# Patient Record
Sex: MALE | Race: WHITE | NOT HISPANIC OR LATINO | URBAN - METROPOLITAN AREA
[De-identification: names, ages, dates, MRNs, and addresses within clinical notes are randomized per-mention and may not be internally consistent; named-entity substitution may affect disease eponyms.]

---

## 2020-07-14 ENCOUNTER — OFFICE VISIT (OUTPATIENT)
Dept: URBAN - METROPOLITAN AREA CLINIC 113 | Facility: CLINIC | Age: 70
End: 2020-07-14

## 2020-07-25 ENCOUNTER — TELEPHONE ENCOUNTER (OUTPATIENT)
Dept: URBAN - METROPOLITAN AREA CLINIC 13 | Facility: CLINIC | Age: 70
End: 2020-07-25

## 2020-07-25 RX ORDER — DOXYCYCLINE HYCLATE 50 MG/1
TAKE CAPSULE  PRN CAPSULE ORAL
Refills: 0 | OUTPATIENT
End: 2017-09-06

## 2020-07-25 RX ORDER — DEXTROSE 4 G
TAKE 1 TABLET DAILY TABLET,CHEWABLE ORAL
Refills: 0 | OUTPATIENT
End: 2019-03-27

## 2020-07-25 RX ORDER — DIPHENHYDRAMINE HYDROCHLORIDE 25 MG/1
TAKE 1 TABLET TWICE DAILY 2.5 MG TABLET, FILM COATED ORAL
Refills: 0 | OUTPATIENT
End: 2017-09-06

## 2020-07-25 RX ORDER — OMEG3/DHA/EPA/FISH OIL/L.CASEI 120-400MG
TAKE 1 CAPSULE DAILY CAPSULE ORAL
Refills: 0 | OUTPATIENT
End: 2017-09-06

## 2020-07-25 RX ORDER — METHYLCELLULOSE 500 MG/1
TAKE 2 TABLET DAILY TABLET ORAL
Refills: 0 | OUTPATIENT
End: 2017-09-06

## 2020-07-25 RX ORDER — CLOBETASOL PROPIONATE 0.5 MG/G
APPLY INCH  PRN CREAM TOPICAL
Refills: 0 | OUTPATIENT
End: 2017-09-06

## 2020-07-25 RX ORDER — POLYETHYLENE GLYCOL 3350 AND ELECTROLYTES WITH LEMON FLAVOR 236; 22.74; 6.74; 5.86; 2.97 G/4L; G/4L; G/4L; G/4L; G/4L
TAKE AS DIRECTED POWDER, FOR SOLUTION ORAL
Qty: 1 | Refills: 0 | OUTPATIENT
Start: 2017-08-18 | End: 2017-09-06

## 2020-07-25 RX ORDER — CHOLESTYRAMINE 4 G/9G
TAKE PACKET DAILY 1-2 PACKETS POWDER, FOR SUSPENSION ORAL
Refills: 0 | OUTPATIENT
End: 2017-09-06

## 2020-07-26 ENCOUNTER — TELEPHONE ENCOUNTER (OUTPATIENT)
Dept: URBAN - METROPOLITAN AREA CLINIC 13 | Facility: CLINIC | Age: 70
End: 2020-07-26

## 2020-07-26 RX ORDER — BENZONATATE 200 MG/1
CAPSULE ORAL
Qty: 30 | Refills: 0 | Status: ACTIVE | COMMUNITY
Start: 2019-02-27

## 2020-07-26 RX ORDER — BROMPHENIRAMINE MALEATE, PSEUDOEPHEDRINE HYDROCHLORIDE, 2; 30; 10 MG/5ML; MG/5ML; MG/5ML
SYRUP ORAL
Qty: 180 | Refills: 0 | Status: ACTIVE | COMMUNITY
Start: 2019-01-07

## 2020-07-26 RX ORDER — METHYLPREDNISOLONE 4 MG/1
TABLET ORAL
Qty: 21 | Refills: 0 | Status: ACTIVE | COMMUNITY
Start: 2019-02-27

## 2020-07-26 RX ORDER — MELOXICAM 15 MG/1
TABLET ORAL
Qty: 30 | Refills: 0 | Status: ACTIVE | COMMUNITY
Start: 2018-04-02

## 2020-07-26 RX ORDER — INGENOL MEBUTATE 150 UG/G
GEL TOPICAL
Qty: 3 | Refills: 0 | Status: ACTIVE | COMMUNITY
Start: 2020-01-06

## 2020-07-26 RX ORDER — OSELTAMIVIR PHOSPHATE 75 MG/1
CAPSULE ORAL
Qty: 10 | Refills: 0 | Status: ACTIVE | COMMUNITY
Start: 2019-02-20

## 2020-07-26 RX ORDER — AMOXICILLIN AND CLAVULANATE POTASSIUM 875; 125 MG/1; MG/1
TABLET, FILM COATED ORAL
Qty: 20 | Refills: 0 | Status: ACTIVE | COMMUNITY
Start: 2020-04-03

## 2020-07-26 RX ORDER — AZITHROMYCIN DIHYDRATE 250 MG/1
TABLET, FILM COATED ORAL
Qty: 6 | Refills: 0 | Status: ACTIVE | COMMUNITY
Start: 2020-03-30

## 2020-07-26 RX ORDER — OMEPRAZOLE 20 MG/1
TAKE 1 TABLET DAILY TABLET, DELAYED RELEASE ORAL
Refills: 0 | Status: ACTIVE | COMMUNITY

## 2020-07-26 RX ORDER — ATORVASTATIN CALCIUM 40 MG/1
TAKE 1 TABLET  FIVE TIMES WEEKLY TABLET, FILM COATED ORAL
Refills: 0 | Status: ACTIVE | COMMUNITY

## 2020-07-26 RX ORDER — TOBRAMYCIN AND DEXAMETHASONE 3; 1 MG/ML; MG/ML
SUSPENSION/ DROPS OPHTHALMIC
Qty: 5 | Refills: 0 | Status: ACTIVE | COMMUNITY
Start: 2019-01-10

## 2020-07-26 RX ORDER — CODEINE PHOSPHATE AND GUAIFENESIN 10; 100 MG/5ML; MG/5ML
SOLUTION ORAL
Qty: 120 | Refills: 0 | Status: ACTIVE | COMMUNITY
Start: 2018-09-24

## 2020-07-26 RX ORDER — FLUTICASONE PROPIONATE 50 UG/1
INSTILL 2 SQUIRT DAILY SPRAY, METERED NASAL
Refills: 0 | Status: ACTIVE | COMMUNITY

## 2020-07-26 RX ORDER — MONTELUKAST SODIUM 10 MG/1
TABLET, FILM COATED ORAL
Qty: 30 | Refills: 0 | Status: ACTIVE | COMMUNITY
Start: 2018-04-02

## 2020-07-26 RX ORDER — GUAIFENESIN AND CODEINE PHOSPHATE 10; 100 MG/5ML; MG/5ML
LIQUID ORAL
Qty: 50 | Refills: 0 | Status: ACTIVE | COMMUNITY
Start: 2020-04-03

## 2020-07-26 RX ORDER — AZITHROMYCIN DIHYDRATE 250 MG/1
TABLET, FILM COATED ORAL
Qty: 6 | Refills: 0 | Status: ACTIVE | COMMUNITY
Start: 2019-02-27

## 2020-07-26 RX ORDER — AMOXICILLIN AND CLAVULANATE POTASSIUM 875; 125 MG/1; MG/1
TABLET, FILM COATED ORAL
Qty: 14 | Refills: 0 | Status: ACTIVE | COMMUNITY
Start: 2018-09-24

## 2020-07-26 RX ORDER — PREDNISONE 20 MG/1
TABLET ORAL
Qty: 10 | Refills: 0 | Status: ACTIVE | COMMUNITY
Start: 2019-01-22

## 2020-07-26 RX ORDER — HYDROCHLOROTHIAZIDE 50 MG/1
TABLET ORAL
Qty: 30 | Refills: 0 | Status: ACTIVE | COMMUNITY
Start: 2018-07-24

## 2020-07-26 RX ORDER — HYDROCHLOROTHIAZIDE 50 MG/1
TAKE 1 TABLET DAILY TABLET ORAL
Refills: 0 | Status: ACTIVE | COMMUNITY

## 2020-07-26 RX ORDER — ALBUTEROL SULFATE 90 UG/1
AEROSOL, METERED RESPIRATORY (INHALATION)
Qty: 8 | Refills: 0 | Status: ACTIVE | COMMUNITY
Start: 2020-04-01

## 2020-07-26 RX ORDER — ALBUTEROL SULFATE 90 UG/1
AEROSOL, METERED RESPIRATORY (INHALATION)
Qty: 85 | Refills: 0 | Status: ACTIVE | COMMUNITY
Start: 2019-02-27

## 2020-07-26 RX ORDER — CLOBETASOL PROPIONATE 0.5 MG/ML
SOLUTION TOPICAL
Qty: 50 | Refills: 0 | Status: ACTIVE | COMMUNITY
Start: 2018-02-26

## 2020-07-26 RX ORDER — VALSARTAN 160 MG/1
TABLET ORAL
Qty: 30 | Refills: 0 | Status: ACTIVE | COMMUNITY
Start: 2020-04-01

## 2021-03-02 ENCOUNTER — ERX REFILL RESPONSE (OUTPATIENT)
Dept: URBAN - METROPOLITAN AREA CLINIC 113 | Facility: CLINIC | Age: 71
End: 2021-03-02

## 2021-03-02 RX ORDER — PANCRELIPASE 24000; 76000; 120000 [USP'U]/1; [USP'U]/1; [USP'U]/1
TAKE 2 CAPSULES BY MOUTH THREE TIMES DAILY BEFORE MEALS CAPSULE, DELAYED RELEASE PELLETS ORAL
Qty: 300 | Refills: 1

## 2021-03-15 ENCOUNTER — TELEPHONE ENCOUNTER (OUTPATIENT)
Dept: URBAN - METROPOLITAN AREA CLINIC 113 | Facility: CLINIC | Age: 71
End: 2021-03-15

## 2021-03-23 ENCOUNTER — OFFICE VISIT (OUTPATIENT)
Dept: URBAN - METROPOLITAN AREA CLINIC 113 | Facility: CLINIC | Age: 71
End: 2021-03-23

## 2021-03-26 ENCOUNTER — WEB ENCOUNTER (OUTPATIENT)
Dept: URBAN - METROPOLITAN AREA CLINIC 113 | Facility: CLINIC | Age: 71
End: 2021-03-26

## 2021-03-26 ENCOUNTER — OFFICE VISIT (OUTPATIENT)
Dept: URBAN - METROPOLITAN AREA CLINIC 113 | Facility: CLINIC | Age: 71
End: 2021-03-26
Payer: MEDICARE

## 2021-03-26 VITALS
RESPIRATION RATE: 20 BRPM | HEART RATE: 88 BPM | DIASTOLIC BLOOD PRESSURE: 57 MMHG | WEIGHT: 198 LBS | SYSTOLIC BLOOD PRESSURE: 89 MMHG | TEMPERATURE: 98.2 F | HEIGHT: 71 IN | BODY MASS INDEX: 27.72 KG/M2

## 2021-03-26 DIAGNOSIS — K86.81 EXOCRINE PANCREATIC INSUFFICIENCY: ICD-10-CM

## 2021-03-26 DIAGNOSIS — Z86.010 HISTORY OF COLON POLYPS: ICD-10-CM

## 2021-03-26 PROCEDURE — 99212 OFFICE O/P EST SF 10 MIN: CPT | Performed by: INTERNAL MEDICINE

## 2021-03-26 RX ORDER — BROMPHENIRAMINE MALEATE, PSEUDOEPHEDRINE HYDROCHLORIDE, 2; 30; 10 MG/5ML; MG/5ML; MG/5ML
SYRUP ORAL
Qty: 180 | Refills: 0 | Status: DISCONTINUED | COMMUNITY
Start: 2019-01-07

## 2021-03-26 RX ORDER — METHYLPREDNISOLONE 4 MG/1
TABLET ORAL
Qty: 21 | Refills: 0 | Status: DISCONTINUED | COMMUNITY
Start: 2019-02-27

## 2021-03-26 RX ORDER — BENZONATATE 200 MG/1
CAPSULE ORAL
Qty: 30 | Refills: 0 | Status: DISCONTINUED | COMMUNITY
Start: 2019-02-27

## 2021-03-26 RX ORDER — HYDROCHLOROTHIAZIDE 50 MG/1
TAKE 1 TABLET DAILY TABLET ORAL
Refills: 0 | Status: DISCONTINUED | COMMUNITY

## 2021-03-26 RX ORDER — OMEPRAZOLE 20 MG/1
TAKE 1 TABLET DAILY TABLET, DELAYED RELEASE ORAL
Refills: 0 | Status: DISCONTINUED | COMMUNITY

## 2021-03-26 RX ORDER — PANCRELIPASE 24000; 76000; 120000 [USP'U]/1; [USP'U]/1; [USP'U]/1
TAKE 2 CAPSULES BY MOUTH THREE TIMES DAILY BEFORE MEALS CAPSULE, DELAYED RELEASE PELLETS ORAL
Qty: 300 | Refills: 1 | Status: ACTIVE | COMMUNITY

## 2021-03-26 RX ORDER — VALSARTAN 160 MG/1
1 TABLET TABLET ORAL ONCE A DAY
Refills: 0 | Status: ACTIVE | COMMUNITY
Start: 2020-04-01

## 2021-03-26 RX ORDER — MONTELUKAST SODIUM 10 MG/1
TABLET, FILM COATED ORAL
Qty: 30 | Refills: 0 | Status: DISCONTINUED | COMMUNITY
Start: 2018-04-02

## 2021-03-26 RX ORDER — AZITHROMYCIN DIHYDRATE 250 MG/1
TABLET, FILM COATED ORAL
Qty: 6 | Refills: 0 | Status: DISCONTINUED | COMMUNITY
Start: 2019-02-27

## 2021-03-26 RX ORDER — ATORVASTATIN CALCIUM 40 MG/1
TAKE 1 TABLET  FIVE TIMES WEEKLY TABLET, FILM COATED ORAL
Refills: 0 | Status: ACTIVE | COMMUNITY

## 2021-03-26 RX ORDER — FLUTICASONE PROPIONATE 50 UG/1
INSTILL 2 SQUIRT DAILY SPRAY, METERED NASAL
Refills: 0 | Status: ACTIVE | COMMUNITY

## 2021-03-26 RX ORDER — INGENOL MEBUTATE 150 UG/G
GEL TOPICAL
Qty: 3 | Refills: 0 | Status: DISCONTINUED | COMMUNITY
Start: 2020-01-06

## 2021-03-26 RX ORDER — CLOBETASOL PROPIONATE 0.5 MG/ML
SOLUTION TOPICAL
Qty: 50 | Refills: 0 | Status: ACTIVE | COMMUNITY
Start: 2018-02-26

## 2021-03-26 RX ORDER — AMOXICILLIN AND CLAVULANATE POTASSIUM 875; 125 MG/1; MG/1
TABLET, FILM COATED ORAL
Qty: 14 | Refills: 0 | Status: DISCONTINUED | COMMUNITY
Start: 2018-09-24

## 2021-03-26 RX ORDER — TOBRAMYCIN AND DEXAMETHASONE 3; 1 MG/ML; MG/ML
SUSPENSION/ DROPS OPHTHALMIC
Qty: 5 | Refills: 0 | Status: DISCONTINUED | COMMUNITY
Start: 2019-01-10

## 2021-03-26 RX ORDER — CODEINE PHOSPHATE AND GUAIFENESIN 10; 100 MG/5ML; MG/5ML
SOLUTION ORAL
Qty: 120 | Refills: 0 | Status: DISCONTINUED | COMMUNITY
Start: 2018-09-24

## 2021-03-26 RX ORDER — ALBUTEROL SULFATE 90 UG/1
AEROSOL, METERED RESPIRATORY (INHALATION)
Qty: 85 | Refills: 0 | Status: DISCONTINUED | COMMUNITY
Start: 2019-02-27

## 2021-03-26 RX ORDER — OSELTAMIVIR PHOSPHATE 75 MG/1
CAPSULE ORAL
Qty: 10 | Refills: 0 | Status: DISCONTINUED | COMMUNITY
Start: 2019-02-20

## 2021-03-26 RX ORDER — PREDNISONE 20 MG/1
TABLET ORAL
Qty: 10 | Refills: 0 | Status: DISCONTINUED | COMMUNITY
Start: 2019-01-22

## 2021-03-26 RX ORDER — GUAIFENESIN AND CODEINE PHOSPHATE 10; 100 MG/5ML; MG/5ML
LIQUID ORAL
Qty: 50 | Refills: 0 | Status: DISCONTINUED | COMMUNITY
Start: 2020-04-03

## 2021-03-26 RX ORDER — MELOXICAM 15 MG/1
TABLET ORAL
Qty: 30 | Refills: 0 | Status: DISCONTINUED | COMMUNITY
Start: 2018-04-02

## 2021-03-26 NOTE — EXAM-PHYSICAL EXAM
He is alert and oriented to person place and situation in no acute distress.  He has no scleral icterus.

## 2021-03-26 NOTE — HPI-TODAY'S VISIT:
The patient is a very pleasant 70-year-old gentleman who I last saw in March 2019 for diarrhea.  We have been following him for chronic diarrhea.  Pancreatic enzymes at that time had resolved his diarrhea.  CT scan of the pancreas was unremarkable.  Last colonoscopy was in January 2017 by Dr. Camargo.  This examination revealed diverticulosis and a diminutive polyp.  The polyp returned as a tubular adenoma so he would therefore be due for colonoscopy in August 2022.  CT scan in August 2017 revealed a large right kidney stone.  Diverticulosis and bilateral inguinal hernias.  Laboratory testing 1 year ago revealed a normal CMP. The patient states he is done very well since I last saw him.  His only new past medical history was recurring problems and nephrolithiasis.  He states pancreatic enzymes work very well to control diarrhea.  He does discuss with me concerns that he had that his urology practice fired him but he is not sure why.  He is very upset about this.

## 2021-07-23 ENCOUNTER — TELEPHONE ENCOUNTER (OUTPATIENT)
Dept: URBAN - METROPOLITAN AREA CLINIC 113 | Facility: CLINIC | Age: 71
End: 2021-07-23

## 2021-07-23 RX ORDER — PANCRELIPASE 24000; 76000; 120000 [USP'U]/1; [USP'U]/1; [USP'U]/1
TAKE 2 CAPSULES BY MOUTH THREE TIMES DAILY BEFORE MEALS CAPSULE, DELAYED RELEASE PELLETS ORAL
Qty: 540 | Refills: 1

## 2022-05-17 ENCOUNTER — ERX REFILL RESPONSE (OUTPATIENT)
Dept: URBAN - METROPOLITAN AREA CLINIC 113 | Facility: CLINIC | Age: 72
End: 2022-05-17

## 2022-05-17 RX ORDER — PANCRELIPASE 24000; 76000; 120000 [USP'U]/1; [USP'U]/1; [USP'U]/1
TAKE 2 CAPSULES BY MOUTH THREE TIMES DAILY BEFORE MEALS CAPSULE, DELAYED RELEASE PELLETS ORAL
Qty: 540 | Refills: 1 | OUTPATIENT

## 2022-06-20 ENCOUNTER — OFFICE VISIT (OUTPATIENT)
Dept: URBAN - METROPOLITAN AREA CLINIC 113 | Facility: CLINIC | Age: 72
End: 2022-06-20
Payer: MEDICARE

## 2022-06-20 ENCOUNTER — LAB OUTSIDE AN ENCOUNTER (OUTPATIENT)
Dept: URBAN - METROPOLITAN AREA CLINIC 113 | Facility: CLINIC | Age: 72
End: 2022-06-20

## 2022-06-20 ENCOUNTER — TELEPHONE ENCOUNTER (OUTPATIENT)
Dept: URBAN - METROPOLITAN AREA CLINIC 113 | Facility: CLINIC | Age: 72
End: 2022-06-20

## 2022-06-20 VITALS
WEIGHT: 199 LBS | TEMPERATURE: 98 F | BODY MASS INDEX: 27.86 KG/M2 | DIASTOLIC BLOOD PRESSURE: 72 MMHG | SYSTOLIC BLOOD PRESSURE: 117 MMHG | HEIGHT: 71 IN | HEART RATE: 68 BPM

## 2022-06-20 DIAGNOSIS — K52.9 CHRONIC DIARRHEA: ICD-10-CM

## 2022-06-20 DIAGNOSIS — K86.81 EXOCRINE PANCREATIC INSUFFICIENCY: ICD-10-CM

## 2022-06-20 DIAGNOSIS — Z86.010 HISTORY OF COLON POLYPS: ICD-10-CM

## 2022-06-20 PROCEDURE — 99214 OFFICE O/P EST MOD 30 MIN: CPT | Performed by: INTERNAL MEDICINE

## 2022-06-20 RX ORDER — CLOBETASOL PROPIONATE 0.5 MG/ML
SOLUTION TOPICAL
Qty: 50 | Refills: 0 | Status: ACTIVE | COMMUNITY
Start: 2018-02-26

## 2022-06-20 RX ORDER — VALSARTAN 160 MG/1
1 TABLET TABLET ORAL ONCE A DAY
Refills: 0 | Status: ACTIVE | COMMUNITY
Start: 2020-04-01

## 2022-06-20 RX ORDER — ATORVASTATIN CALCIUM 40 MG/1
TAKE 1 TABLET  FIVE TIMES WEEKLY TABLET, FILM COATED ORAL
Refills: 0 | Status: ACTIVE | COMMUNITY

## 2022-06-20 RX ORDER — PANCRELIPASE 24000; 76000; 120000 [USP'U]/1; [USP'U]/1; [USP'U]/1
TAKE 2 CAPSULES BY MOUTH THREE TIMES DAILY BEFORE MEALS CAPSULE, DELAYED RELEASE PELLETS ORAL
Qty: 540 | Refills: 1 | Status: ACTIVE | COMMUNITY

## 2022-06-20 RX ORDER — FLUTICASONE PROPIONATE 50 UG/1
INSTILL 2 SQUIRT DAILY SPRAY, METERED NASAL
Refills: 0 | Status: ACTIVE | COMMUNITY

## 2022-06-20 RX ORDER — POLYETHYLENE GLYCOL 3350, SODIUM CHLORIDE, SODIUM BICARBONATE, POTASSIUM CHLORIDE 420; 11.2; 5.72; 1.48 G/4L; G/4L; G/4L; G/4L
420 GM POWDER, FOR SOLUTION ORAL ONCE
Qty: 420 GM | Refills: 0 | OUTPATIENT
Start: 2022-06-20 | End: 2022-06-21

## 2022-06-20 NOTE — HPI-TODAY'S VISIT:
The patient is a very pleasant 70-year-old gentleman who I last saw in March 2019 for diarrhea.  We have been following him for chronic diarrhea.  Pancreatic enzymes at that time had resolved his diarrhea.  CT scan of the pancreas was unremarkable.  Last colonoscopy was in January 2017 by Dr. Camargo.  This examination revealed diverticulosis and a diminutive polyp.  The polyp returned as a tubular adenoma so he would therefore be due for colonoscopy in August 2022.  CT scan in August 2017 revealed a large right kidney stone.  Diverticulosis and bilateral inguinal hernias.  Laboratory testing 1 year ago revealed a normal CMP. The patient states he is done very well since I last saw him.  His only new past medical history was recurring problems and nephrolithiasis.  He states pancreatic enzymes work very well to control diarrhea.  He does discuss with me concerns that he had that his urology practice fired him but he is not sure why.  He is very upset about this. Interval history.  The patient states that his diarrhea was doing quite well till about 6 months ago and has had to steadily increase his pancreatic enzymes where he now takes 2 tablets 3 times a day to have control.  He has not noted any change in diet or medications other than the increase in his antihypertensives.  No dietary changes.

## 2022-06-21 ENCOUNTER — TELEPHONE ENCOUNTER (OUTPATIENT)
Dept: URBAN - METROPOLITAN AREA CLINIC 113 | Facility: CLINIC | Age: 72
End: 2022-06-21

## 2022-06-21 RX ORDER — CHOLESTYRAMINE 4 G/9G
1 SCOOP POWDER, FOR SUSPENSION ORAL TWICE A DAY
Qty: 180 | Refills: 2 | OUTPATIENT
Start: 2022-06-21

## 2022-08-09 ENCOUNTER — OFFICE VISIT (OUTPATIENT)
Dept: URBAN - METROPOLITAN AREA SURGERY CENTER 25 | Facility: SURGERY CENTER | Age: 72
End: 2022-08-09
Payer: MEDICARE

## 2022-08-09 ENCOUNTER — CLAIMS CREATED FROM THE CLAIM WINDOW (OUTPATIENT)
Dept: URBAN - METROPOLITAN AREA CLINIC 4 | Facility: CLINIC | Age: 72
End: 2022-08-09
Payer: MEDICARE

## 2022-08-09 DIAGNOSIS — K63.89 OTHER SPECIFIED DISEASES OF INTESTINE: ICD-10-CM

## 2022-08-09 DIAGNOSIS — K52.89 OTHER SPECIFIED NONINFECTIVE GASTROENTERITIS AND COLITIS: ICD-10-CM

## 2022-08-09 DIAGNOSIS — K52.89 OTHER AND UNSPECIFIED NONINFECTIOUS GASTROENTERITIS AND COLITIS: ICD-10-CM

## 2022-08-09 DIAGNOSIS — Z86.010 HISTORY OF COLON POLYPS: ICD-10-CM

## 2022-08-09 PROCEDURE — G8907 PT DOC NO EVENTS ON DISCHARG: HCPCS | Performed by: INTERNAL MEDICINE

## 2022-08-09 PROCEDURE — 45380 COLONOSCOPY AND BIOPSY: CPT | Performed by: INTERNAL MEDICINE

## 2022-08-09 PROCEDURE — 88305 TISSUE EXAM BY PATHOLOGIST: CPT | Performed by: PATHOLOGY

## 2022-08-09 RX ORDER — CHOLESTYRAMINE 4 G/9G
1 SCOOP POWDER, FOR SUSPENSION ORAL TWICE A DAY
Qty: 180 | Refills: 2 | Status: ACTIVE | COMMUNITY
Start: 2022-06-21

## 2022-08-09 RX ORDER — FLUTICASONE PROPIONATE 50 UG/1
INSTILL 2 SQUIRT DAILY SPRAY, METERED NASAL
Refills: 0 | Status: ACTIVE | COMMUNITY

## 2022-08-09 RX ORDER — ATORVASTATIN CALCIUM 40 MG/1
TAKE 1 TABLET  FIVE TIMES WEEKLY TABLET, FILM COATED ORAL
Refills: 0 | Status: ACTIVE | COMMUNITY

## 2022-08-09 RX ORDER — VALSARTAN 160 MG/1
1 TABLET TABLET ORAL ONCE A DAY
Refills: 0 | Status: ACTIVE | COMMUNITY
Start: 2020-04-01

## 2022-08-09 RX ORDER — PANCRELIPASE 24000; 76000; 120000 [USP'U]/1; [USP'U]/1; [USP'U]/1
TAKE 2 CAPSULES BY MOUTH THREE TIMES DAILY BEFORE MEALS CAPSULE, DELAYED RELEASE PELLETS ORAL
Qty: 540 | Refills: 1 | Status: ACTIVE | COMMUNITY

## 2022-08-09 RX ORDER — CLOBETASOL PROPIONATE 0.5 MG/ML
SOLUTION TOPICAL
Qty: 50 | Refills: 0 | Status: ACTIVE | COMMUNITY
Start: 2018-02-26

## 2022-08-19 PROBLEM — 428283002: Status: ACTIVE | Noted: 2021-03-26

## 2022-08-31 ENCOUNTER — TELEPHONE ENCOUNTER (OUTPATIENT)
Dept: URBAN - METROPOLITAN AREA CLINIC 113 | Facility: CLINIC | Age: 72
End: 2022-08-31

## 2022-08-31 RX ORDER — PANCRELIPASE 24000; 76000; 120000 [USP'U]/1; [USP'U]/1; [USP'U]/1
AS DIRECTED CAPSULE, DELAYED RELEASE PELLETS ORAL
Qty: 540 | Refills: 3

## 2023-10-26 ENCOUNTER — ERX REFILL RESPONSE (OUTPATIENT)
Dept: URBAN - METROPOLITAN AREA CLINIC 113 | Facility: CLINIC | Age: 73
End: 2023-10-26

## 2023-10-26 RX ORDER — PANCRELIPASE 24000; 76000; 120000 [USP'U]/1; [USP'U]/1; [USP'U]/1
AS DIRECTED CAPSULE, DELAYED RELEASE PELLETS ORAL
Qty: 540 | Refills: 3 | OUTPATIENT

## 2023-10-31 ENCOUNTER — OFFICE VISIT (OUTPATIENT)
Dept: URBAN - METROPOLITAN AREA CLINIC 113 | Facility: CLINIC | Age: 73
End: 2023-10-31
Payer: MEDICARE

## 2023-10-31 ENCOUNTER — DASHBOARD ENCOUNTERS (OUTPATIENT)
Age: 73
End: 2023-10-31

## 2023-10-31 VITALS
SYSTOLIC BLOOD PRESSURE: 121 MMHG | BODY MASS INDEX: 26.52 KG/M2 | HEART RATE: 65 BPM | TEMPERATURE: 97.3 F | DIASTOLIC BLOOD PRESSURE: 65 MMHG | WEIGHT: 189.4 LBS | RESPIRATION RATE: 16 BRPM | HEIGHT: 71 IN

## 2023-10-31 DIAGNOSIS — K52.89 OTHER SPECIFIED NONINFECTIVE GASTROENTERITIS AND COLITIS: ICD-10-CM

## 2023-10-31 DIAGNOSIS — R19.7 CHRONIC DIARRHEA: ICD-10-CM

## 2023-10-31 DIAGNOSIS — K86.81 EXOCRINE PANCREATIC INSUFFICIENCY: ICD-10-CM

## 2023-10-31 DIAGNOSIS — Z86.010 HISTORY OF COLON POLYPS: ICD-10-CM

## 2023-10-31 PROCEDURE — 99213 OFFICE O/P EST LOW 20 MIN: CPT

## 2023-10-31 RX ORDER — ATORVASTATIN CALCIUM 40 MG/1
TAKE 1 TABLET  FIVE TIMES WEEKLY TABLET, FILM COATED ORAL
Refills: 0 | Status: ACTIVE | COMMUNITY

## 2023-10-31 RX ORDER — PANCRELIPASE 24000; 76000; 120000 [USP'U]/1; [USP'U]/1; [USP'U]/1
AS DIRECTED CAPSULE, DELAYED RELEASE PELLETS ORAL
Qty: 540 | Refills: 3 | Status: ACTIVE | COMMUNITY

## 2023-10-31 RX ORDER — CHOLESTYRAMINE 4 G/9G
1 SCOOP POWDER, FOR SUSPENSION ORAL TWICE A DAY
Qty: 180 | Refills: 2 | Status: ACTIVE | COMMUNITY
Start: 2022-06-21

## 2023-10-31 RX ORDER — FLUTICASONE PROPIONATE 50 UG/1
INSTILL 2 SQUIRT DAILY SPRAY, METERED NASAL
Refills: 0 | Status: ACTIVE | COMMUNITY

## 2023-10-31 RX ORDER — VALSARTAN 160 MG/1
1 TABLET TABLET ORAL ONCE A DAY
Refills: 0 | Status: ACTIVE | COMMUNITY
Start: 2020-04-01

## 2023-10-31 RX ORDER — CELECOXIB 200 MG/1
1 CAPSULE WITH FOOD CAPSULE ORAL
Status: ACTIVE | COMMUNITY

## 2023-10-31 RX ORDER — PANCRELIPASE 24000; 76000; 120000 [USP'U]/1; [USP'U]/1; [USP'U]/1
AS DIRECTED CAPSULE, DELAYED RELEASE PELLETS ORAL
Qty: 540 | Refills: 3
End: 2024-10-25

## 2023-10-31 RX ORDER — CLOBETASOL PROPIONATE 0.5 MG/ML
SOLUTION TOPICAL
Qty: 50 | Refills: 0 | Status: ACTIVE | COMMUNITY
Start: 2018-02-26

## 2023-10-31 NOTE — PHYSICAL EXAM PSYCH:
normal mood with appropriate affect , good eye contact , normal mood with appropriate affect , good eye contact

## 2023-10-31 NOTE — PHYSICAL EXAM EYES:
Sclerae : White without injection , Conjuntivae and eyelids appear normal , Sclerae : White without injection , Conjuntivae and eyelids appear normal

## 2023-10-31 NOTE — HPI-TODAY'S VISIT:
The patient is a very pleasant 70-year-old gentleman who I last saw in March 2019 for diarrhea.  We have been following him for chronic diarrhea.  Pancreatic enzymes at that time had resolved his diarrhea.  CT scan of the pancreas was unremarkable.  Last colonoscopy was in January 2017 by Dr. Camargo.  This examination revealed diverticulosis and a diminutive polyp.  The polyp returned as a tubular adenoma so he would therefore be due for colonoscopy in August 2022.  CT scan in August 2017 revealed a large right kidney stone.  Diverticulosis and bilateral inguinal hernias.  Laboratory testing 1 year ago revealed a normal CMP.  The patient states he is done very well since I last saw him.  His only new past medical history was recurring problems and nephrolithiasis.  He states pancreatic enzymes work very well to control diarrhea.  He does discuss with me concerns that he had that his urology practice fired him but he is not sure why.  He is very upset about this.  Interval history.  The patient states that his diarrhea was doing quite well till about 6 months ago and has had to steadily increase his pancreatic enzymes where he now takes 2 tablets 3 times a day to have control.  He has not noted any change in diet or medications other than the increase in his antihypertensives.  No dietary changes.  Interval history, 10/31/2023: 73-year-old male presents for long interval follow-up.  He was last seen on 6/20/2022.  His diarrhea responded well to pancreatic enzymes that he was provided Questran should his symptoms persist.  He was due for surveillance colonoscopy.  Colonoscopy 8/9/2022:Performed without difficulty.  BBPS score of 9.  Distal ileum contained a few scattered nonbleeding erosions, biopsy.  Pathology revealed focal acute ileitis with erosions but no features of chronicity.  This is most suggestive of ubiquitous use of NSAIDs or resolving infectious diarrheal illness though Crohn's disease and ischemic enteritis cannot be completely ruled out.  Diverticulosis in the sigmoid colon.  Repeat colonoscopy in 10 years for screening purposes.  He is presenting for medication refill.  Labs 2/21/2023:Hemoglobin 12.1, hematocrit 37.1. Labs 8/1/2023:Unremarkable BMP, normal ALT, unremarkable lipid panel.  He takes 2 capsules of Creon about twice per day. He will take the first dose with his breatkfast and the second dose at night without food. He takes Questran as needed. He will have some issues leaving the house in the morning due to diarrhea otherwise, once he is out, he has no issues. He denies blood per rectum or significant abdominal pain. Denies nausea or vomiting. Denies heartburn or dysohagia. He is taking Celebrex every other day for joint pains. He takes a daily Prilosec as well.

## 2023-10-31 NOTE — PHYSICAL EXAM CONSTITUTIONAL:
normal , pleasant, well nourished, in no acute distress , normal , pleasant, well nourished, in no acute distress

## 2024-11-16 ENCOUNTER — ERX REFILL RESPONSE (OUTPATIENT)
Dept: URBAN - METROPOLITAN AREA CLINIC 113 | Facility: CLINIC | Age: 74
End: 2024-11-16

## 2024-11-16 RX ORDER — PANCRELIPASE 36000; 180000; 114000 [USP'U]/1; [USP'U]/1; [USP'U]/1
2 TABLETS WITH MEALS, AND 1 TABLET WITH SNACKS CAPSULE, DELAYED RELEASE PELLETS ORAL
Qty: 300 | Refills: 0 | OUTPATIENT

## 2024-11-16 RX ORDER — PANCRELIPASE 24000; 76000; 120000 [USP'U]/1; [USP'U]/1; [USP'U]/1
TAKE TWO CAPSULES BY MOUTH THREE TIMES DAILY BEFORE MEALS CAPSULE, DELAYED RELEASE PELLETS ORAL
Qty: 540 CAPSULE | Refills: 4 | OUTPATIENT

## 2024-11-26 ENCOUNTER — TELEPHONE ENCOUNTER (OUTPATIENT)
Dept: URBAN - METROPOLITAN AREA CLINIC 107 | Facility: CLINIC | Age: 74
End: 2024-11-26

## 2024-12-03 ENCOUNTER — OFFICE VISIT (OUTPATIENT)
Dept: URBAN - METROPOLITAN AREA CLINIC 107 | Facility: CLINIC | Age: 74
End: 2024-12-03
Payer: COMMERCIAL

## 2024-12-03 VITALS
BODY MASS INDEX: 28 KG/M2 | SYSTOLIC BLOOD PRESSURE: 170 MMHG | OXYGEN SATURATION: 97 % | HEART RATE: 90 BPM | RESPIRATION RATE: 20 BRPM | DIASTOLIC BLOOD PRESSURE: 90 MMHG | WEIGHT: 200 LBS | TEMPERATURE: 97.7 F | HEIGHT: 71 IN

## 2024-12-03 DIAGNOSIS — K86.81 EXOCRINE PANCREATIC INSUFFICIENCY: ICD-10-CM

## 2024-12-03 DIAGNOSIS — Z86.0100 HISTORY OF COLON POLYPS: ICD-10-CM

## 2024-12-03 DIAGNOSIS — K21.9 GASTROESOPHAGEAL REFLUX DISEASE WITHOUT ESOPHAGITIS: ICD-10-CM

## 2024-12-03 DIAGNOSIS — K52.9 ILEITIS: ICD-10-CM

## 2024-12-03 PROBLEM — 266435005: Status: ACTIVE | Noted: 2024-12-03

## 2024-12-03 PROCEDURE — 99214 OFFICE O/P EST MOD 30 MIN: CPT | Performed by: NURSE PRACTITIONER

## 2024-12-03 RX ORDER — VALSARTAN 80 MG/1
1 TABLET TABLET, FILM COATED ORAL ONCE A DAY
Refills: 0 | Status: ACTIVE | COMMUNITY
Start: 2020-04-01

## 2024-12-03 RX ORDER — CLOBETASOL PROPIONATE 0.5 MG/ML
SOLUTION TOPICAL
Qty: 50 | Refills: 0 | Status: ACTIVE | COMMUNITY
Start: 2018-02-26

## 2024-12-03 RX ORDER — PANCRELIPASE 24000; 76000; 120000 [USP'U]/1; [USP'U]/1; [USP'U]/1
AS DIRECTED CAPSULE, DELAYED RELEASE PELLETS ORAL
Refills: 0 | Status: ACTIVE | COMMUNITY

## 2024-12-03 RX ORDER — CELECOXIB 200 MG/1
1 CAPSULE WITH FOOD CAPSULE ORAL
Status: DISCONTINUED | COMMUNITY

## 2024-12-03 RX ORDER — ATORVASTATIN CALCIUM 40 MG/1
TAKE 1 TABLET  FIVE TIMES WEEKLY TABLET, FILM COATED ORAL
Refills: 0 | Status: ACTIVE | COMMUNITY

## 2024-12-03 RX ORDER — CHOLESTYRAMINE 4 G/9G
1 SCOOP POWDER, FOR SUSPENSION ORAL TWICE A DAY
Qty: 180 | Refills: 2 | Status: DISCONTINUED | COMMUNITY
Start: 2022-06-21

## 2024-12-03 RX ORDER — FLUTICASONE PROPIONATE 50 UG/1
INSTILL 2 SQUIRT DAILY SPRAY, METERED NASAL
Refills: 0 | Status: ACTIVE | COMMUNITY

## 2024-12-03 RX ORDER — OMEPRAZOLE 20 MG/1
1 CAPSULE 1/2 TO 1 HOUR BEFORE MORNING MEAL CAPSULE, DELAYED RELEASE ORAL ONCE A DAY
Status: ACTIVE | COMMUNITY

## 2024-12-03 NOTE — HPI-TODAY'S VISIT:
The patient is a very pleasant 74-year-old gentleman here for annual visit. We have been following him for chronic diarrhea.  Pancreatic enzymes at that time had resolved his diarrhea.  CT scan of the pancreas was unremarkable.  Last colonoscopy was in January 2017 by Dr. Camargo.  This examination revealed diverticulosis and a diminutive polyp.  The polyp returned as a tubular adenoma, so he would therefore be due for colonoscopy in August 2022.  CT scan in August 2017 revealed a large right kidney stone.  Diverticulosis and bilateral inguinal hernias.  Laboratory testing 1 year ago revealed a normal CMP.  The patient states he is done very well since I last saw him.  His only new past medical history was recurring problems and nephrolithiasis.  He states pancreatic enzymes work very well to control diarrhea.  He does discuss with me concerns that he had that his urology practice fired him, but he is not sure why.  He is very upset about this.  Interval history.  The patient states that his diarrhea was doing quite well till about 6 months ago and has had to steadily increase his pancreatic enzymes where he now takes 2 tablets 3 times a day to have control.  He has not noted any change in diet or medications other than the increase in his antihypertensives.  No dietary changes.  Interval history, 10/31/2023: 73-year-old male presents for long interval follow-up.  He was last seen on 6/20/2022.  His diarrhea responded well to pancreatic enzymes that he was provided Questran should his symptoms persist.  He was due for surveillance colonoscopy.  Colonoscopy 8/9/2022:Performed without difficulty.  BBPS score of 9.  Distal ileum contained a few scattered nonbleeding erosions, biopsy.  Pathology revealed focal acute ileitis with erosions but no features of chronicity.  This is most suggestive of ubiquitous use of NSAIDs or resolving infectious diarrheal illness though Crohn's disease and ischemic enteritis cannot be completely ruled out.  Diverticulosis in the sigmoid colon.  Repeat colonoscopy in 10 years for screening purposes.  He is presenting for medication refill.  Labs 2/21/2023:Hemoglobin 12.1, hematocrit 37.1. Labs 8/1/2023:Unremarkable BMP, normal ALT, unremarkable lipid panel.  He takes 2 capsules of Creon about twice per day. He will take the first dose with his breakfast and the second dose at night without food. He takes Questran as needed. He will have some issues leaving the house in the morning due to diarrhea otherwise, once he is out, he has no issues. He denies blood per rectum or significant abdominal pain. Denies nausea or vomiting. Denies heartburn or dysphagia. He is taking Celebrex every other day for joint pains. He takes a daily Prilosec as well.  Last seen 10/31/2023 for EPI, chronic diarrhea, ileitis and history of colon polyps. Continue Creon currently on 24K units. Takes 2 capsules 3 times daily before meals. Consider increasing 36,000 units. Continue Questran for exacerbation. Acute ileitis was felt likely secondary to NSAID use. Due for surveillance colonoscopy 2032.  Interval history, 12/3/2024 CT abdomen and pelvis without contrast renal stone protocol 9/26/2024. Cyst in segment 8 of the liver measuring 10 mm. Left-sided nephrolithiasis with calculi in the distal left ureter near ureterovesical junction no left-sided obstructive uropathy. Sigmoid colonic diverticulosis without acute diverticulitis. Labs 7/9/2024. CBC: WBC 11.7, hemoglobin normal 15.1. CMP: Creatinine 1.75, GFR 41. LFTs normal. Labs 8/7/2024. BMP: Creatinine 1.57, GFR 46.  Today he is doing well from a GI standpoint.  He does drink alcohol most days.  1-2 drinks a day.  GERD controlled with OTC Prilosec.  He has not started the higher dose Creon that was recently prescribed. He follows nephrology Dr. Barfield for elevated creatinine. He has also been dealing with kidney stones. No nausea, vomiting, diarrhea, melena, hematochezia or abdominal pain.  Weight is up 11 pounds

## 2025-03-01 ENCOUNTER — ERX REFILL RESPONSE (OUTPATIENT)
Dept: URBAN - METROPOLITAN AREA CLINIC 72 | Facility: CLINIC | Age: 75
End: 2025-03-01

## 2025-03-01 RX ORDER — PANCRELIPASE 24000; 76000; 120000 [USP'U]/1; [USP'U]/1; [USP'U]/1
AS DIRECTED CAPSULE, DELAYED RELEASE PELLETS ORAL
Refills: 0 | OUTPATIENT

## 2025-03-01 RX ORDER — PANCRELIPASE 24000; 76000; 120000 [USP'U]/1; [USP'U]/1; [USP'U]/1
THREE CAPSULE WITH MEALS, 2 WITH SNACKS CAPSULE, DELAYED RELEASE PELLETS ORAL
Qty: 300 | Refills: 5 | OUTPATIENT

## 2025-05-06 ENCOUNTER — TELEPHONE ENCOUNTER (OUTPATIENT)
Dept: URBAN - METROPOLITAN AREA CLINIC 107 | Facility: CLINIC | Age: 75
End: 2025-05-06

## 2025-05-06 RX ORDER — PANCRELIPASE 36000; 180000; 114000 [USP'U]/1; [USP'U]/1; [USP'U]/1
2 TABLETS WITH MEALS, AND 1 TABLET WITH SNACKS CAPSULE, DELAYED RELEASE PELLETS ORAL
Qty: 300 | Refills: 11